# Patient Record
Sex: MALE | Race: WHITE | Employment: FULL TIME | URBAN - NONMETROPOLITAN AREA
[De-identification: names, ages, dates, MRNs, and addresses within clinical notes are randomized per-mention and may not be internally consistent; named-entity substitution may affect disease eponyms.]

---

## 2024-05-21 ENCOUNTER — OFFICE VISIT (OUTPATIENT)
Dept: HEMATOLOGY/ONCOLOGY | Facility: CLINIC | Age: 59
End: 2024-05-21
Payer: COMMERCIAL

## 2024-05-21 VITALS
DIASTOLIC BLOOD PRESSURE: 81 MMHG | HEART RATE: 67 BPM | HEIGHT: 72 IN | SYSTOLIC BLOOD PRESSURE: 134 MMHG | WEIGHT: 190.48 LBS | TEMPERATURE: 97.5 F | RESPIRATION RATE: 16 BRPM | BODY MASS INDEX: 25.8 KG/M2 | OXYGEN SATURATION: 97 %

## 2024-05-21 DIAGNOSIS — I82.532 CHRONIC DEEP VEIN THROMBOSIS (DVT) OF LEFT POPLITEAL VEIN (MULTI): Primary | ICD-10-CM

## 2024-05-21 PROCEDURE — 1036F TOBACCO NON-USER: CPT | Performed by: INTERNAL MEDICINE

## 2024-05-21 PROCEDURE — 99214 OFFICE O/P EST MOD 30 MIN: CPT | Performed by: INTERNAL MEDICINE

## 2024-05-21 PROCEDURE — 99204 OFFICE O/P NEW MOD 45 MIN: CPT | Performed by: INTERNAL MEDICINE

## 2024-05-21 RX ORDER — VENLAFAXINE HYDROCHLORIDE 37.5 MG/1
CAPSULE, EXTENDED RELEASE ORAL
COMMUNITY
Start: 2024-01-15

## 2024-05-21 RX ORDER — ATORVASTATIN CALCIUM 40 MG/1
40 TABLET, FILM COATED ORAL DAILY
COMMUNITY
Start: 2024-03-12 | End: 2024-06-10

## 2024-05-21 ASSESSMENT — PAIN SCALES - GENERAL: PAINLEVEL: 0-NO PAIN

## 2024-05-21 NOTE — PROGRESS NOTES
Patient cleared for surgery with Dr Drew right meniscus.  Patient to take Xarelto daily after surgery for approximately 2 weeks.  Early ambulation encouraged.  1 month supply sent by provider.   Patient aware of providers recommendation to take Xarelto during flights or long travel.  Coupon provided.  Call back instructions reviewed.  Follow-up on an as needed basis.  Patient verbalized understanding.

## 2024-05-21 NOTE — PROGRESS NOTES
Patient ID: Robert Aiken is a 58 y.o. male.  Referring Physician: No referring provider defined for this encounter.  Primary Care Provider: Janes Lynch DO      ORDERS & PATIENT INSTRUCTIONS:    Patient is cleared for right meniscus surgery.  Patient was recommended to restart Xarelto 10 mg p.o. daily after surgery for approximately 2 weeks and also recommended early ambulation.  Will give 1 month supply, patient can take as needed Xarelto during flights or long distance driving    Please give Xarelto coupon  Follow-up as needed          CONSULTING PHYSICIAN:  Dr Jas De La Vega    REASON FOR CONSULTATION:  History of DVT      HISTORY OF PRESENT ILLNESS:  Patient is a 58-year-old white man with past medical history of DVT diagnosed 2 to 3 days after left on meniscus surgery involving left popliteal vein, [facility] was [facility], 1 week later he started having chest discomfort, lightheadedness again went to the hospital and was admitted at [facility] and was and had workup done including MRI and MRA to rule out TIA/CVA as well as PE and workup was negative, patient was continued on Xarelto for total of 90 days as this was provoked DVT and was discontinued.  Patient is now planning to go for right meniscus surgery.  So I been asked to further evaluate for consideration of prophylactic anticoagulation.          PAST MEDICAL HISTORY:  Left popliteal DVT 3 days after his left meniscus surgery sometime in March 2022, neuropathy hyperlipidemia arthritis          CURRENT MEDICATION:  Current Outpatient Medications   Medication Sig Dispense Refill    atorvastatin (Lipitor) 40 mg tablet Take 1 tablet (40 mg) by mouth once daily.      venlafaxine XR (Effexor-XR) 37.5 mg 24 hr capsule TAKE 1 CAPSULE BY MOUTH EVERY DAY WITH FOOD FOR 90 DAYS       No current facility-administered medications for this visit.                 REVIEW OF SYSTEMS:    Right knee pain related to meniscus tear, denies any  "headache fever cough chest pain shortness of breath or bleeding from any orifice.  Rest of the 10 review of system negative and as per HPI.    PHYSICAL EXAM:  Objective   BSA: 2.1 meters squared,   /81   Pulse 67   Temp 36.4 °C (97.5 °F) (Temporal)   Resp 16   Ht (S) 1.838 m (6' 0.36\")   Wt 86.4 kg (190 lb 7.6 oz)   SpO2 97%   BMI 25.58 kg/m²   Gen:  Conscious,  no acute distress,   HEENT: Normocephalic, no icterus. . No nasal discharge,  Oral mucosa moist  Chest: Bilateral symmetrical, Bilateral AE        CVS: S1S2.   Abdomen: Soft, no guarding or rigidity BS+   Extremities: No C/C   Skin: No petechiae          LABS:    CBC:No results for input(s): \"WBC\", \"ANC\", \"HGB\", \"HCT\", \"PLT\", \"MCV\" in the last 40863 hours.    No lab exists for component: \"DIF\"    CMP:No results for input(s): \"NA\", \"K\", \"CL\", \"CO2\", \"ANIONGAP\", \"BUN\", \"CREATININE\", \"EGFR\", \"MG\" in the last 97225 hours.No results for input(s): \"ALBUMIN\", \"ALKPHOS\", \"ALT\", \"AST\", \"BILITOT\", \"LIPASE\" in the last 73137 hours.    No lab exists for component: \"CA\"    HEME/ENDO:No results for input(s): \"FERRITIN\", \"IRONSAT\", \"TSH\", \"IQGKCTGI17\", \"FOLATE\" in the last 79088 hours.     MICRO: No results for input(s): \"ESR\", \"CRP\", \"PROCAL\" in the last 58860 hours.  No results found for the last 90 days.        TUMOR MARKERS:  No results found for: \"LABCA2\", \"CEA\", \"\", \"PSA\", \"AFPTM\", \"HCGTM\", \"\"             IMAGING:         No image results found.               SOCIAL HISTORY:    reports current alcohol use of about 4.0 standard drinks of alcohol per week.   reports no history of drug use.,   Tobacco Use: Medium Risk (5/21/2024)    Patient History     Smoking Tobacco Use: Former     Smokeless Tobacco Use: Former     Passive Exposure: Past   Patient is a  but no combat experience smoked 1 pack/day for 3 years and quit.  Drinks 2 alcoholic drink twice a week    FAMILY HISTORY:  No family history on file.  Father had pulmonary embolism " coronary artery disease diabetes mellitus, no malignancy in the family  ALLERGY:   Denies allergy          ASSESSMENT & PLAN:    Patient is a 58-year-old white man with history of left popliteal DVT 3 days after his left meniscus surgery who is planning to go for right arthroscopic meniscus surgery.  I explained to him that patient is somewhat high risk due to previous history of venous thromboembolism at the same time surgery is arthroscopic and overall considered low risk surgery and I would recommend prophylactic Xarelto 10 mg p.o. daily for 2 weeks after surgery with early ambulation.  I have reviewed records from his DVT hospital admission from [facility]  I will given him prescription of Xarelto and have cleared him for surgery.  Patient was also advised to take as needed 10 mg Xarelto during any air travel or prolonged travel to prevent future venous thromboembolism.  Patient agreed with the plan  I do not believe the patient requires any follow-up appointment at the same time I will be happy to reevaluate in case any new concern arises    Medication reviewed in e-chart  labs reviewed and interpreted independently, X rays independently reviewed  Notes from other physicians involved in care were reviewed      Thank you very much for allowing me to participate in care of this patient, should you have any further question please do not hesitate to contact me.    Charting was completed using voice recognition technology and may include unintended errors.    SWATI JUDGE MD, EDWARD.    Karen López Master Clinician in Hematology and Oncology  Medical Director, [facility] at [facility].  [place] office  Phone [phone]  Fax      [phone]    [facility] /[place].  Phone [phone]  Fax     [phone]

## 2024-05-21 NOTE — PATIENT INSTRUCTIONS
ORDERS & PATIENT INSTRUCTIONS:     Patient is cleared for right meniscus surgery.  Patient was recommended to restart Xarelto 10 mg p.o. daily after surgery for approximately 2 weeks and also recommended early ambulation.  Will give 1 month supply, patient can take as needed Xarelto during flights or long distance driving     Please give Xarelto coupon  Follow-up as needed